# Patient Record
Sex: MALE | Race: WHITE | NOT HISPANIC OR LATINO | Employment: UNEMPLOYED | URBAN - METROPOLITAN AREA
[De-identification: names, ages, dates, MRNs, and addresses within clinical notes are randomized per-mention and may not be internally consistent; named-entity substitution may affect disease eponyms.]

---
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
## 2025-06-05 ENCOUNTER — OFFICE VISIT (OUTPATIENT)
Dept: FAMILY MEDICINE CLINIC | Facility: CLINIC | Age: 20
End: 2025-06-05
Payer: COMMERCIAL

## 2025-06-05 VITALS
SYSTOLIC BLOOD PRESSURE: 128 MMHG | RESPIRATION RATE: 16 BRPM | TEMPERATURE: 99 F | HEART RATE: 90 BPM | WEIGHT: 197.63 LBS | OXYGEN SATURATION: 97 % | DIASTOLIC BLOOD PRESSURE: 84 MMHG | BODY MASS INDEX: 24.57 KG/M2 | HEIGHT: 75 IN

## 2025-06-05 DIAGNOSIS — E55.9 VITAMIN D DEFICIENCY: ICD-10-CM

## 2025-06-05 DIAGNOSIS — E67.8 HYPERVITAMINOSIS: ICD-10-CM

## 2025-06-05 DIAGNOSIS — Z00.00 ROUTINE MEDICAL EXAM: Primary | ICD-10-CM

## 2025-06-05 DIAGNOSIS — Z71.85 IMMUNIZATION COUNSELING: ICD-10-CM

## 2025-06-05 DIAGNOSIS — E78.1 HYPERTRIGLYCERIDEMIA: ICD-10-CM

## 2025-06-05 DIAGNOSIS — Z13.9 ENCOUNTER FOR SCREENING: ICD-10-CM

## 2025-06-05 NOTE — PROGRESS NOTES
Subjective:   Ryan Leonard is a 20 year old male who presents for Physical     20-year-old male coming in for routine physical.  Patient has no acute complaints.  He works out daily by doing cardio and weights.  States that his diet is overall pretty healthy.  Denies family history of colon or prostate cancer.  He is mindful of his blood pressure and occasionally checks it at home with measurements around 126/64.  Denies any concerns for STDs.    History/Other:    Chief Complaint Reviewed and Verified  No Further Nursing Notes to   Review  Tobacco Reviewed  Allergies Reviewed  Medications Reviewed    Problem List Reviewed  Medical History Reviewed  Surgical History   Reviewed  Family History Reviewed  Social History Reviewed         Tobacco:  Tobacco Use[1]  E-Cigarettes/Vaping       Questions Responses    E-Cigarette Use Current Some Day User          E-Cigarette/Vaping Substances       Questions Responses    Nicotine Yes    THC No    CBD No    Flavoring No          E-Cigarette/Vaping Devices       Questions Responses    Disposable Yes    Pre-filled or Refillable Cartridge No    Refillable Tank No    Pre-filled Pod No            Current Medications[2]      Review of Systems:  Review of Systems   Constitutional:  Negative for chills, diaphoresis and fever.   HENT:  Negative for congestion, ear discharge, ear pain, sinus pressure, sinus pain and sore throat.    Eyes:  Negative for pain and discharge.   Respiratory:  Negative for cough, chest tightness, shortness of breath and wheezing.    Cardiovascular:  Negative for chest pain and palpitations.   Gastrointestinal:  Negative for abdominal pain, diarrhea, nausea and vomiting.   Endocrine: Negative for cold intolerance and heat intolerance.   Genitourinary:  Negative for dysuria, flank pain, frequency and urgency.   Musculoskeletal:  Negative for joint swelling.   Skin:  Negative for rash.   Neurological:  Negative for dizziness, syncope and headaches.    Psychiatric/Behavioral:  Negative for confusion and hallucinations.        Objective:   /84 (BP Location: Right arm, Patient Position: Sitting, Cuff Size: adult)   Pulse 90   Temp 98.5 °F (36.9 °C) (Temporal)   Resp 16   Ht 6' 3\" (1.905 m)   Wt 197 lb 9.6 oz (89.6 kg)   SpO2 97%   BMI 24.70 kg/m²  Estimated body mass index is 24.7 kg/m² as calculated from the following:    Height as of this encounter: 6' 3\" (1.905 m).    Weight as of this encounter: 197 lb 9.6 oz (89.6 kg).  Physical Exam  Constitutional:       General: He is not in acute distress.     Appearance: Normal appearance. He is not ill-appearing or toxic-appearing.   HENT:      Head: Normocephalic and atraumatic.      Right Ear: Tympanic membrane and ear canal normal.      Left Ear: Tympanic membrane and ear canal normal.      Mouth/Throat:      Mouth: Mucous membranes are moist.      Pharynx: Oropharynx is clear. No oropharyngeal exudate or posterior oropharyngeal erythema.   Eyes:      Extraocular Movements: Extraocular movements intact.      Pupils: Pupils are equal, round, and reactive to light.   Cardiovascular:      Rate and Rhythm: Normal rate and regular rhythm.      Heart sounds: Normal heart sounds. No murmur heard.     No gallop.   Pulmonary:      Effort: Pulmonary effort is normal. No respiratory distress.      Breath sounds: Normal breath sounds. No stridor. No wheezing, rhonchi or rales.   Abdominal:      General: Bowel sounds are normal.      Palpations: Abdomen is soft.      Tenderness: There is no abdominal tenderness. There is no right CVA tenderness, left CVA tenderness or guarding.   Musculoskeletal:         General: No swelling.      Cervical back: Normal range of motion and neck supple. No rigidity or tenderness.      Right lower leg: No edema.      Left lower leg: No edema.   Skin:     General: Skin is warm and dry.   Neurological:      General: No focal deficit present.      Mental Status: He is alert and oriented to  person, place, and time. Mental status is at baseline.      Cranial Nerves: No cranial nerve deficit.      Sensory: No sensory deficit.      Motor: Motor function is intact. No weakness.      Gait: Gait normal.   Psychiatric:         Mood and Affect: Mood normal.         Behavior: Behavior normal.         Thought Content: Thought content normal.         Judgment: Judgment normal.         Assessment & Plan:   1. Routine medical exam (Primary)  -Healthy diet and lifestyle.  -Exercise as tolerated.  -Dental exam every 6 months or as recommended by dentist.  -Eye exams annually, at least every 2 years or as recommended by specialist.  -     CBC, Platelet; No Differential  -     Comp Metabolic Panel (14)  -     Hemoglobin A1C  -     Lipid Panel  -     TSH W Reflex To Free T4  2. Hypertriglyceridemia  -Healthy diet and lifestyle.  -Exercise as tolerated.  -     Lipid Panel  3. Vitamin D deficiency  -     Vitamin D; Future; Expected date: 06/05/2025  4. Hypervitaminosis  -     Vitamin B6  5. Encounter for screening  Patient would like to get testosterone levels checked.  -     Testosterone, Total And Free; Future; Expected date: 06/05/2025  6. Immunization counseling  Immunizations discussed with patient.          Return in about 1 year (around 6/5/2026) for Routine physical exam visit.    Lewis Ovalle MD, 6/5/2025, 4:17 PM          [1]   Social History  Tobacco Use   Smoking Status Some Days    Types: Cigarettes   Smokeless Tobacco Never   [2]   No current outpatient medications on file.